# Patient Record
Sex: MALE | Race: BLACK OR AFRICAN AMERICAN | NOT HISPANIC OR LATINO | Employment: UNEMPLOYED | URBAN - METROPOLITAN AREA
[De-identification: names, ages, dates, MRNs, and addresses within clinical notes are randomized per-mention and may not be internally consistent; named-entity substitution may affect disease eponyms.]

---

## 2023-12-06 ENCOUNTER — HOSPITAL ENCOUNTER (EMERGENCY)
Facility: HOSPITAL | Age: 58
Discharge: HOME | End: 2023-12-07
Attending: EMERGENCY MEDICINE
Payer: MEDICAID

## 2023-12-06 DIAGNOSIS — B86 SCABIES: Primary | ICD-10-CM

## 2023-12-06 LAB
ANION GAP SERPL CALC-SCNC: 12 MMOL/L (ref 10–20)
BASOPHILS # BLD MANUAL: 0.11 X10*3/UL (ref 0–0.1)
BASOPHILS NFR BLD MANUAL: 1.7 %
BUN SERPL-MCNC: 15 MG/DL (ref 6–23)
CALCIUM SERPL-MCNC: 10.4 MG/DL (ref 8.6–10.6)
CHLORIDE SERPL-SCNC: 104 MMOL/L (ref 98–107)
CO2 SERPL-SCNC: 29 MMOL/L (ref 21–32)
CREAT SERPL-MCNC: 0.79 MG/DL (ref 0.5–1.3)
EOSINOPHIL # BLD MANUAL: 0.91 X10*3/UL (ref 0–0.7)
EOSINOPHIL NFR BLD MANUAL: 13.8 %
ERYTHROCYTE [DISTWIDTH] IN BLOOD BY AUTOMATED COUNT: 12.9 % (ref 11.5–14.5)
GFR SERPL CREATININE-BSD FRML MDRD: >90 ML/MIN/1.73M*2
GLUCOSE SERPL-MCNC: 96 MG/DL (ref 74–99)
HCT VFR BLD AUTO: 43.4 % (ref 41–52)
HGB BLD-MCNC: 14.6 G/DL (ref 13.5–17.5)
HIV 1+2 AB+HIV1 P24 AG SERPL QL IA: NONREACTIVE
IMM GRANULOCYTES # BLD AUTO: 0.02 X10*3/UL (ref 0–0.7)
IMM GRANULOCYTES NFR BLD AUTO: 0.3 % (ref 0–0.9)
LYMPHOCYTES # BLD MANUAL: 2.33 X10*3/UL (ref 1.2–4.8)
LYMPHOCYTES NFR BLD MANUAL: 35.3 %
MCH RBC QN AUTO: 27.3 PG (ref 26–34)
MCHC RBC AUTO-ENTMCNC: 33.6 G/DL (ref 32–36)
MCV RBC AUTO: 81 FL (ref 80–100)
MONOCYTES # BLD MANUAL: 0.23 X10*3/UL (ref 0.1–1)
MONOCYTES NFR BLD MANUAL: 3.5 %
NEUTS SEG # BLD MANUAL: 3.02 X10*3/UL (ref 1.2–7)
NEUTS SEG NFR BLD MANUAL: 45.7 %
NRBC BLD-RTO: 0 /100 WBCS (ref 0–0)
PLATELET # BLD AUTO: 271 X10*3/UL (ref 150–450)
POTASSIUM SERPL-SCNC: 3.9 MMOL/L (ref 3.5–5.3)
RBC # BLD AUTO: 5.34 X10*6/UL (ref 4.5–5.9)
RBC MORPH BLD: ABNORMAL
SODIUM SERPL-SCNC: 141 MMOL/L (ref 136–145)
TARGETS BLD QL SMEAR: ABNORMAL
TOTAL CELLS COUNTED BLD: 116
WBC # BLD AUTO: 6.6 X10*3/UL (ref 4.4–11.3)

## 2023-12-06 PROCEDURE — 99284 EMERGENCY DEPT VISIT MOD MDM: CPT | Performed by: EMERGENCY MEDICINE

## 2023-12-06 PROCEDURE — 87389 HIV-1 AG W/HIV-1&-2 AB AG IA: CPT

## 2023-12-06 PROCEDURE — 36415 COLL VENOUS BLD VENIPUNCTURE: CPT

## 2023-12-06 PROCEDURE — 85007 BL SMEAR W/DIFF WBC COUNT: CPT

## 2023-12-06 PROCEDURE — 86780 TREPONEMA PALLIDUM: CPT | Performed by: EMERGENCY MEDICINE

## 2023-12-06 PROCEDURE — 2500000001 HC RX 250 WO HCPCS SELF ADMINISTERED DRUGS (ALT 637 FOR MEDICARE OP)

## 2023-12-06 PROCEDURE — 85027 COMPLETE CBC AUTOMATED: CPT

## 2023-12-06 PROCEDURE — 36415 COLL VENOUS BLD VENIPUNCTURE: CPT | Performed by: EMERGENCY MEDICINE

## 2023-12-06 PROCEDURE — 99283 EMERGENCY DEPT VISIT LOW MDM: CPT

## 2023-12-06 PROCEDURE — 82374 ASSAY BLOOD CARBON DIOXIDE: CPT

## 2023-12-06 RX ORDER — CEPHALEXIN 250 MG/1
500 CAPSULE ORAL ONCE
Status: COMPLETED | OUTPATIENT
Start: 2023-12-06 | End: 2023-12-06

## 2023-12-06 RX ORDER — DIPHENHYDRAMINE HCL 25 MG
25 CAPSULE ORAL ONCE
Status: COMPLETED | OUTPATIENT
Start: 2023-12-06 | End: 2023-12-06

## 2023-12-06 RX ADMIN — DIPHENHYDRAMINE HYDROCHLORIDE 25 MG: 25 CAPSULE ORAL at 22:52

## 2023-12-06 RX ADMIN — CEPHALEXIN 500 MG: 250 CAPSULE ORAL at 22:52

## 2023-12-06 ASSESSMENT — COLUMBIA-SUICIDE SEVERITY RATING SCALE - C-SSRS
6. HAVE YOU EVER DONE ANYTHING, STARTED TO DO ANYTHING, OR PREPARED TO DO ANYTHING TO END YOUR LIFE?: NO
2. HAVE YOU ACTUALLY HAD ANY THOUGHTS OF KILLING YOURSELF?: NO
1. IN THE PAST MONTH, HAVE YOU WISHED YOU WERE DEAD OR WISHED YOU COULD GO TO SLEEP AND NOT WAKE UP?: NO

## 2023-12-06 ASSESSMENT — PAIN SCALES - GENERAL: PAINLEVEL_OUTOF10: 3

## 2023-12-06 ASSESSMENT — PAIN - FUNCTIONAL ASSESSMENT: PAIN_FUNCTIONAL_ASSESSMENT: 0-10

## 2023-12-06 NOTE — Clinical Note
Michelle Betancourt was seen and treated in our emergency department on 12/6/2023.  He may return to work on 12/08/2023.       If you have any questions or concerns, please don't hesitate to call.      Beryl Bustillos MD

## 2023-12-07 VITALS
HEIGHT: 68 IN | WEIGHT: 180 LBS | DIASTOLIC BLOOD PRESSURE: 90 MMHG | RESPIRATION RATE: 18 BRPM | OXYGEN SATURATION: 94 % | SYSTOLIC BLOOD PRESSURE: 141 MMHG | HEART RATE: 77 BPM | BODY MASS INDEX: 27.28 KG/M2

## 2023-12-07 LAB — T PALLIDUM AB SER QL: NONREACTIVE

## 2023-12-07 RX ORDER — DIPHENHYDRAMINE HCL 25 MG
25 CAPSULE ORAL EVERY 8 HOURS PRN
Qty: 30 CAPSULE | Refills: 0 | Status: SHIPPED | OUTPATIENT
Start: 2023-12-07

## 2023-12-07 RX ORDER — PERMETHRIN 50 MG/G
1 CREAM TOPICAL ONCE
Qty: 1 G | Refills: 0 | Status: SHIPPED | OUTPATIENT
Start: 2023-12-07 | End: 2023-12-07

## 2023-12-07 RX ORDER — IVERMECTIN 3 MG/1
TABLET ORAL
Qty: 5 TABLET | Refills: 0 | Status: SHIPPED | OUTPATIENT
Start: 2023-12-07

## 2023-12-07 ASSESSMENT — LIFESTYLE VARIABLES
REASON UNABLE TO ASSESS: NO
EVER FELT BAD OR GUILTY ABOUT YOUR DRINKING: NO
EVER HAD A DRINK FIRST THING IN THE MORNING TO STEADY YOUR NERVES TO GET RID OF A HANGOVER: NO
HAVE YOU EVER FELT YOU SHOULD CUT DOWN ON YOUR DRINKING: NO
HAVE PEOPLE ANNOYED YOU BY CRITICIZING YOUR DRINKING: NO

## 2023-12-07 NOTE — ED TRIAGE NOTES
Patient reports multiple bites from a flying insect this week having a reaction all over body. Pt would also like to be medically cleared for dental work.

## 2023-12-07 NOTE — ED PROVIDER NOTES
CC: Wound Check, Rash, and Dental Problem     HPI: Michelle Betancourt is an 58 y.o. male with no past medical history presenting to the emergency department for a rash.  He reports a persistent going on for the last few weeks.  He notes that it began as what he believes are bug bites.  He describes it as starting in 1 area and now spreading throughout his whole body.  He notes that it is itchy.  Denies burning.  Denies history of HIV.  Notes that no one else in the house has been experiencing both bites.  He describes them as everywhere.  Denies fevers and chills.    Limitations to History: None  Additional History Obtained from: Patient and patient's family    PMHx/PSHx:  Per HPI.   - has no past medical history on file.  - has no past surgical history on file.    Social History:  - Tobacco:  has no history on file for tobacco use.   - Alcohol:  has no history on file for alcohol use.   - Drugs:  has no history on file for drug use.     Medications: Reviewed in EMR.     Allergies:  Patient has no known allergies.    ???????????????????????????????????????????????????????????????  Triage Vitals:  T    HR 77  /90  RR 18  O2 94 %      Physical Exam  Constitutional:       General: He is not in acute distress.  HENT:      Head: Normocephalic.   Cardiovascular:      Rate and Rhythm: Normal rate.   Pulmonary:      Effort: Pulmonary effort is normal. No respiratory distress.   Abdominal:      General: Abdomen is flat.   Musculoskeletal:         General: Normal range of motion.   Skin:     General: Skin is dry.      Comments: Multiple vesicular rashes.  He has clusters of these located in multiple regions, most evident on the proximal right upper extremity and right forearm. There is central crusting in this area. He also has isolated locations scattered on the bilateral upper extremities and linear pustules on the back in 3-4 areas measuring 3-8 cm in length. They have an erythematous base but do not appear  infected or cellulitis. He has very faint 1-2 spots on the palms.   Neurological:      Mental Status: He is alert and oriented to person, place, and time. Mental status is at baseline.       ???????????????????????????????????????????????????????????????      ED Course/Medical Decision Making:  Patient is a 58-year-old male presenting with a skin rash.  Differential at this time included chickenpox versus monkeypox versus scabies versus HIV versus lymphoma versus syphilis.  We obtained a HIV, syphilis and CBC and CMP.  HIV was negative.  CBC was unremarkable.  Patient was signed out to incoming provider pending syphilis.     If negative we will plan to reach patient's additional parenteral, and treat for scabies as the patient is insistent on these being bug bites.  Clinically do not necessarily consistent with bug bites, but the linear lesions on the patient's back could be secondary to scabies.  He is treated orally with ivermectin and given a prescription if his syphilis is negative.  He will certainly need follow-up with dermatology, and this was discussed with the patient, and he understands the importance of this.    External records reviewed: recent inpatient, clinic, and prior ED notes  Diagnostic imaging independently reviewed/interpreted by me (as reflected in MDM) includes: N/A  Social Determinants Affecting Care: None identified  Discussion of management with other providers: N/A  Prescription Drug Consideration: Considered Keflex, Benadryl and permethrin and ivermectin  Escalation of Care: na    Impression:   Rash    Disposition: Handoff pending syphilis results, and likely discharge      Procedures ? [email or web address] last updated 12/8/2023 2:07 AM       -------------------------------------------  This patient was seen by the resident physician.  I have seen and examined the patient, agree with the workup, evaluation, management and diagnosis. I reviewed and edited the above documentation where necessary.      Ottoniel Hernandez MD   Attending Physician      Ottoniel Hernandez MD MPH  12/08/23 0212       Ottoniel Hernandez MD MPH  12/08/23 0212

## 2023-12-07 NOTE — DISCHARGE INSTRUCTIONS
Please follow-up with dermatology.  Please follow-up with primary care as well.  Please take medications as prescribed.  Please return if symptoms or not improving within 3 days for unable to get an outpatient appointment..

## 2023-12-11 PROBLEM — I26.99 PULMONARY EMBOLISM (MULTI): Status: ACTIVE | Noted: 2023-12-11

## 2023-12-11 RX ORDER — WARFARIN 1 MG/1
1 TABLET ORAL 2 TIMES DAILY
COMMUNITY
Start: 2021-12-13 | End: 2023-12-12 | Stop reason: ALTCHOICE

## 2023-12-11 RX ORDER — WARFARIN SODIUM 5 MG/1
5 TABLET ORAL
COMMUNITY
Start: 2021-12-13 | End: 2023-12-12 | Stop reason: ALTCHOICE

## 2023-12-11 NOTE — PROGRESS NOTES
Michelle Betancourt is a 58 y.o. male with idiopathic submassive DVT and PE in 2021 (follows with vascular medicine, antiphospholipid workup negative) presenting to [facility] after ED visit for rash, which is thought to be due to scabies. HIV and syphillis tests were negative.  Patient was discharged on ivermectin 3 mg tablet and diphenhydramine.  Was advised to also follow-up with dermatology    Subjective     Reports bit by small insect initially on leg 5-6 weeks ago. Insect was flying/jumping. It started to worsen and he tried witch hazel which exacerbated rash. The rash then further worsened and is present in multiple locations from neck to shins including genital areas. Not on face, mouth, or scalp. The rash is painful 4/10 and itchy. 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  Patient was Given ivermectin PO and permetherin and banophen. Some improvement after this treatment course    Less exercise in last 3 years, shoulder injury   BP has 120/80s    Missing one-two teeth    Bruising in iv site from ED       No past medical history on file.     Review of Systems   Constitutional:  Negative for chills, diaphoresis and fatigue.   HENT:  Negative for congestion and dental problem.    Eyes:  Negative for discharge.   Respiratory:  Negative for apnea.    Gastrointestinal:  Negative for abdominal distention.   Genitourinary:  Negative for difficulty urinating and dysuria.   Musculoskeletal:  Negative for arthralgias.   Skin:  Positive for color change and rash. Negative for pallor and wound.   Allergic/Immunologic: Negative for environmental allergies.   Hematological:  Negative for adenopathy.        Current Outpatient Medications on File Prior to Visit   Medication Sig Dispense Refill    diphenhydrAMINE (BenadryL) 25 mg capsule Take 1 capsule (25 mg) by mouth every 8 hours if needed for itching or allergies. 30 capsule 0    ivermectin (Stromectol) 3 mg tablet give on days 1, 2, 8, 9, and 15. Use  permethrin cream on those days. 5 tablet 0    [] permethrin (Elimite) 5 % cream Apply 1 Application topically 1 time for 1 dose. Apply to entire body; leave on for 8 to 14 hours before removing by washing (shower or bath). Repeat this regimen daily for 7 days, and then twice weekly until symptoms have resolved. 1 g 0     No current facility-administered medications on file prior to visit.        Objective     Last Recorded Vitals  There were no vitals taken for this visit.    Physical Exam  HENT:      Head: Normocephalic.   Pulmonary:      Effort: Pulmonary effort is normal.   Musculoskeletal:      Comments: Scabbed lesions throughout back, legs, arms, genitals, and trunk. From back of neck to lower shins. No oral mucosa involvement. Largest on right forearm. Itchy, but no scratch marks on lesion. No weeping or bleeding. Please see media tab for representative image.    Neurological:      Mental Status: He is alert.           Media Information      Document Information        Admission Weight       Daily Weight  23 : 81.6 kg (180 lb)    Image Results  Electrocardiogram 12 Lead  Please see ED Provider Note for formal interpretation  Confirmed by Norris Jarrett ([id number]) on 2022 3:09:38 PM      Relevant Results    Lab Results   Component Value Date    WBC 6.6 2023    HGB 14.6 2023    HCT 43.4 2023    MCV 81 2023     2023          Chemistry    Lab Results   Component Value Date/Time     2023 2241    K 3.9 2023 2241     2023 2241    CO2 29 2023 2241    BUN 15 2023 2241    CREATININE 0.79 2023 2241    Lab Results   Component Value Date/Time    CALCIUM 10.4 2023 2241    ALKPHOS 78 2022 1706    AST 19 2022 1706    ALT 15 2022 1706    BILITOT 0.5 2022 1706        Lab Results   Component Value Date    CHOL 232 (H) 2023     Lab Results   Component Value Date    HDL 39.7 2023     Lab  "Results   Component Value Date    LDLCALC 140 (H) 12/12/2023     Lab Results   Component Value Date    TRIG 262 (H) 12/12/2023     No components found for: \"CHOLHDL\"   No results found for: \"HGBA1C\"    The 10-year ASCVD risk score (Ruby PA, et al., 2019) is: 10.6%    Values used to calculate the score:      Age: 58 years      Sex: Male      Is Non- : Yes      Diabetic: No      Tobacco smoker: No      Systolic Blood Pressure: 146 mmHg      Is BP treated: No      HDL Cholesterol: 39.7 mg/dL      Total Cholesterol: 232 mg/dL                 Assessment/Plan        58 y.o. with idiopathic submassive DVT and PE in 2021 (follows with vascular medicine, antiphospholipid workup negative, finished 10 months of warfarin therapy) [place] [facility] [place].         #diffuse rash neck to lower shins.   - images in media tab. Not on oral mucosa, but present on genitals  - no prior dermatologic issues  - feels that it was related to insect bite  - HIV and syphillis negative  - reported some improvement when on ivermectin PO, permetherin, and banophen creams.   - no new lesions since ED visit  Plan:   - dermatology referral  - will reorder permetherin  - okay to finish ivermectin course and complete banophane     #elevated blood pressure without diagnosis of hypertenison  - reports decreased exercise over last 3 years  - wants to work on diet and exercise interventions  - reports that he gets nervous at doctors office  - will continue to monitor and address at future visits    #dyslipidemia  - 12/2023 non-fasting lipid panel: HDL 39.7, , Triglycerides: 262  - ASCVD 10.6%  - advised patient to implement diet and exercise changes, and to get fasting lipid panel prior to next visit  - if still high at next visit, should consider starting statin.     #unprovoked idiopathic DVT and PE  - followed with vascular medicine  - was recommended to complete 6 months of AC  - completed 10 months of " "warfarin. Not on warfarin anymore.     #missing tooth  - dental follow up scheduled    #health maintenance  - colon cancer screening: declines. Reports no family history of cancer  - prostate cancer screening: declines  - vaccines: Declines all.       #recent labs  - Renal/liver function nwnl 12/2023  - Lipid panel abnormal 12/2023  - A1c pending 12/2023  - HIV negative 12/2023  - Syphilis negative 12/2023  - TSH normal 12/2023  - urine : normal 12/2023    No results found for: \"HDL\", \"LDL\", \"TSH\"               Daryl Nation MD      I saw and evaluated the patient. I personally obtained the key and critical portions of the history and physical exam or was physically present for key and critical portions performed by the resident/fellow. I reviewed the resident/fellow's documentation and discussed the patient with the resident/fellow. I agree with the resident/fellow's medical decision making as documented in the note.    Alfreda Bingham MD    Patient care and presentation reviewed with the resident  NAME: Michelle Betancourt  HPI:  58 year old male here for rash.  Went to ED last week and referred here.  Had rash that started as insect bite on leg spread to his entire body from lower neck to shins.  NO oral mucosal lesions.  Biggest lesion on his right arm.  It itches and painful.  In ED they did HIV and syphilis test.  Was given Ivermectin, permethrin and banofin.    PMHx: DVT/PE unprovoked 2021 completed anticoagulation therapy  MEDS:  ivermectin, permethrin, diphenhydramine  ALLERGIES:  SOCIAL HX:   OBJECTIVE: crusted lesions arm, back, rare on abdomen  RECOMMEND:  We walked down to dermatology and got him an appointment for Thursday.   Handouts printed for him.     Other prevention and screening addressed per resident  Not interested in any vaccinations  "

## 2023-12-12 ENCOUNTER — OFFICE VISIT (OUTPATIENT)
Dept: PRIMARY CARE | Facility: HOSPITAL | Age: 58
End: 2023-12-12
Payer: MEDICAID

## 2023-12-12 VITALS
OXYGEN SATURATION: 96 % | BODY MASS INDEX: 29.21 KG/M2 | HEIGHT: 68 IN | HEART RATE: 99 BPM | WEIGHT: 192.7 LBS | DIASTOLIC BLOOD PRESSURE: 87 MMHG | SYSTOLIC BLOOD PRESSURE: 146 MMHG | TEMPERATURE: 99.1 F

## 2023-12-12 DIAGNOSIS — E78.5 DYSLIPIDEMIA (HIGH LDL; LOW HDL): ICD-10-CM

## 2023-12-12 DIAGNOSIS — Z13.9 SCREENING DUE: Primary | ICD-10-CM

## 2023-12-12 DIAGNOSIS — R21 RASH: ICD-10-CM

## 2023-12-12 LAB
ALBUMIN SERPL BCP-MCNC: 4.7 G/DL (ref 3.4–5)
ALP SERPL-CCNC: 67 U/L (ref 33–120)
ALT SERPL W P-5'-P-CCNC: 25 U/L (ref 10–52)
ANION GAP SERPL CALC-SCNC: 13 MMOL/L (ref 10–20)
APPEARANCE UR: ABNORMAL
AST SERPL W P-5'-P-CCNC: 22 U/L (ref 9–39)
BILIRUB SERPL-MCNC: 0.5 MG/DL (ref 0–1.2)
BILIRUB UR STRIP.AUTO-MCNC: NEGATIVE MG/DL
BUN SERPL-MCNC: 12 MG/DL (ref 6–23)
CALCIUM SERPL-MCNC: 10.3 MG/DL (ref 8.6–10.6)
CHLORIDE SERPL-SCNC: 103 MMOL/L (ref 98–107)
CHOLEST SERPL-MCNC: 232 MG/DL (ref 0–199)
CHOLESTEROL/HDL RATIO: 5.8
CO2 SERPL-SCNC: 28 MMOL/L (ref 21–32)
COLOR UR: YELLOW
CREAT SERPL-MCNC: 0.87 MG/DL (ref 0.5–1.3)
CREAT UR-MCNC: 259.1 MG/DL (ref 20–370)
ERYTHROCYTE [DISTWIDTH] IN BLOOD BY AUTOMATED COUNT: 13.3 % (ref 11.5–14.5)
GFR SERPL CREATININE-BSD FRML MDRD: >90 ML/MIN/1.73M*2
GLUCOSE SERPL-MCNC: 100 MG/DL (ref 74–99)
GLUCOSE UR STRIP.AUTO-MCNC: NEGATIVE MG/DL
HCT VFR BLD AUTO: 45.6 % (ref 41–52)
HDLC SERPL-MCNC: 39.7 MG/DL
HGB BLD-MCNC: 14.9 G/DL (ref 13.5–17.5)
KETONES UR STRIP.AUTO-MCNC: NEGATIVE MG/DL
LDLC SERPL CALC-MCNC: 140 MG/DL
LEUKOCYTE ESTERASE UR QL STRIP.AUTO: NEGATIVE
MCH RBC QN AUTO: 27.2 PG (ref 26–34)
MCHC RBC AUTO-ENTMCNC: 32.7 G/DL (ref 32–36)
MCV RBC AUTO: 83 FL (ref 80–100)
MICROALBUMIN UR-MCNC: 17.8 MG/L
MICROALBUMIN/CREAT UR: 6.9 UG/MG CREAT
NITRITE UR QL STRIP.AUTO: NEGATIVE
NON HDL CHOLESTEROL: 192 MG/DL (ref 0–149)
NRBC BLD-RTO: 0 /100 WBCS (ref 0–0)
PH UR STRIP.AUTO: 5 [PH]
PLATELET # BLD AUTO: 279 X10*3/UL (ref 150–450)
POC APPEARANCE, URINE: ABNORMAL
POC BILIRUBIN, URINE: NEGATIVE
POC BLOOD, URINE: ABNORMAL
POC COLOR, URINE: ABNORMAL
POC GLUCOSE, URINE: NEGATIVE MG/DL
POC KETONES, URINE: ABNORMAL MG/DL
POC LEUKOCYTES, URINE: NEGATIVE
POC NITRITE,URINE: NEGATIVE
POC PH, URINE: 6 PH
POC PROTEIN, URINE: ABNORMAL MG/DL
POC SPECIFIC GRAVITY, URINE: >=1.03
POC UROBILINOGEN, URINE: 1 EU/DL
POTASSIUM SERPL-SCNC: 4.6 MMOL/L (ref 3.5–5.3)
PROT SERPL-MCNC: 8.4 G/DL (ref 6.4–8.2)
PROT UR STRIP.AUTO-MCNC: NEGATIVE MG/DL
RBC # BLD AUTO: 5.47 X10*6/UL (ref 4.5–5.9)
RBC # UR STRIP.AUTO: NEGATIVE /UL
SODIUM SERPL-SCNC: 139 MMOL/L (ref 136–145)
SP GR UR STRIP.AUTO: 1.03
TRIGL SERPL-MCNC: 262 MG/DL (ref 0–149)
TSH SERPL-ACNC: 2.02 MIU/L (ref 0.44–3.98)
UROBILINOGEN UR STRIP.AUTO-MCNC: 4 MG/DL
VLDL: 52 MG/DL (ref 0–40)
WBC # BLD AUTO: 6.6 X10*3/UL (ref 4.4–11.3)

## 2023-12-12 PROCEDURE — 83036 HEMOGLOBIN GLYCOSYLATED A1C: CPT

## 2023-12-12 PROCEDURE — 81003 URINALYSIS AUTO W/O SCOPE: CPT

## 2023-12-12 PROCEDURE — 84520 ASSAY OF UREA NITROGEN: CPT

## 2023-12-12 PROCEDURE — 80061 LIPID PANEL: CPT

## 2023-12-12 PROCEDURE — 82570 ASSAY OF URINE CREATININE: CPT

## 2023-12-12 PROCEDURE — 99214 OFFICE O/P EST MOD 30 MIN: CPT

## 2023-12-12 PROCEDURE — 85027 COMPLETE CBC AUTOMATED: CPT

## 2023-12-12 PROCEDURE — 36415 COLL VENOUS BLD VENIPUNCTURE: CPT

## 2023-12-12 PROCEDURE — 99214 OFFICE O/P EST MOD 30 MIN: CPT | Mod: GC

## 2023-12-12 PROCEDURE — 1036F TOBACCO NON-USER: CPT

## 2023-12-12 PROCEDURE — 84443 ASSAY THYROID STIM HORMONE: CPT

## 2023-12-12 RX ORDER — PERMETHRIN 50 MG/G
CREAM TOPICAL
COMMUNITY
Start: 2023-12-07

## 2023-12-12 ASSESSMENT — COLUMBIA-SUICIDE SEVERITY RATING SCALE - C-SSRS
2. HAVE YOU ACTUALLY HAD ANY THOUGHTS OF KILLING YOURSELF?: NO
6. HAVE YOU EVER DONE ANYTHING, STARTED TO DO ANYTHING, OR PREPARED TO DO ANYTHING TO END YOUR LIFE?: NO
1. IN THE PAST MONTH, HAVE YOU WISHED YOU WERE DEAD OR WISHED YOU COULD GO TO SLEEP AND NOT WAKE UP?: NO

## 2023-12-12 ASSESSMENT — ENCOUNTER SYMPTOMS
CHILLS: 0
EYE DISCHARGE: 0
ADENOPATHY: 0
FATIGUE: 0
DIFFICULTY URINATING: 0
ARTHRALGIAS: 0
DYSURIA: 0
DIAPHORESIS: 0
ABDOMINAL DISTENTION: 0
COLOR CHANGE: 1
WOUND: 0
APNEA: 0

## 2023-12-12 ASSESSMENT — PATIENT HEALTH QUESTIONNAIRE - PHQ9
SUM OF ALL RESPONSES TO PHQ9 QUESTIONS 1 AND 2: 0
2. FEELING DOWN, DEPRESSED OR HOPELESS: NOT AT ALL
1. LITTLE INTEREST OR PLEASURE IN DOING THINGS: NOT AT ALL

## 2023-12-12 ASSESSMENT — PAIN SCALES - GENERAL: PAINLEVEL: 4

## 2023-12-12 NOTE — PATIENT INSTRUCTIONS
Cordell Betancourt,    Hope you are well. You were seen for Rash.     Please follow up:    Dermatology appointment 12/14 2:15pm  Continue permethermin, banophen, and ivermectin  Follow up to see us in clinic in 6 months for blood pressure, cholesterol level  Continue exercise and diet    Daryl Padilla MD [facility]    Discharge Meds     Your medication list            Accurate as of December 12, 2023  2:19 PM. If you have any questions, ask your nurse or doctor.                CONTINUE taking these medications        Instructions Last Dose Given Next Dose Due   diphenhydrAMINE 25 mg capsule  Commonly known as: BenadryL      Take 1 capsule (25 mg) by mouth every 8 hours if needed for itching or allergies.       ivermectin 3 mg tablet  Commonly known as: Stromectol      give on days 1, 2, 8, 9, and 15. Use permethrin cream on those days.       permethrin 5 % cream  Commonly known as: Elimite                  STOP taking these medications      warfarin 1 mg tablet  Commonly known as: Coumadin  Stopped by: Daryl Nation MD        warfarin 5 mg tablet  Commonly known as: Coumadin  Stopped by: Daryl Nation MD                 Outpatient Follow-Up  Future Appointments   Date Time Provider Department Center   12/14/2023  2:15 PM Manjeet Arreaga MD [id number]

## 2023-12-13 LAB
EST. AVERAGE GLUCOSE BLD GHB EST-MCNC: 97 MG/DL
HBA1C MFR BLD: 5 %

## 2023-12-13 NOTE — PROGRESS NOTES
I saw and evaluated the patient. I personally obtained the key and critical portions of the history and physical exam or was physically present for key and critical portions performed by the resident/fellow. I reviewed the resident/fellow's documentation and discussed the patient with the resident/fellow. I agree with the resident/fellow's medical decision making as documented in the note.    Patient care and presentation reviewed with the resident  NAME: Michelle Betancourt  HPI:  58 year old male here for rash.  Went to ED last week and referred here.  Had rash that started as insect bite on leg spread to his entire body from lower neck to shins.  NO oral mucosal lesions.  Biggest lesion on his right arm.  It itches and painful.  In ED they did HIV and syphilis test.  Was given Ivermectin, permethrin and banofin.    PMHx: DVT/PE unprovoked 2021 completed anticoagulation therapy  MEDS:  ivermectin, permethrin, diphenhydramine  ALLERGIES:  SOCIAL HX:   OBJECTIVE: crusted lesions arm, back, rare on abdomen  RECOMMEND:  We walked down to dermatology and got him an appointment for Thursday.   Handouts printed for him.     Other prevention and screening addressed per resident  Not interested in any vaccinations  Alfreda Bingham MD

## 2023-12-14 ENCOUNTER — OFFICE VISIT (OUTPATIENT)
Dept: DERMATOLOGY | Facility: CLINIC | Age: 58
End: 2023-12-14
Payer: MEDICAID

## 2023-12-14 DIAGNOSIS — R21 RASH AND OTHER NONSPECIFIC SKIN ERUPTION: Primary | ICD-10-CM

## 2023-12-14 DIAGNOSIS — L30.9 DERMATITIS: ICD-10-CM

## 2023-12-14 PROCEDURE — 1036F TOBACCO NON-USER: CPT | Performed by: DERMATOLOGY

## 2023-12-14 PROCEDURE — 88305 TISSUE EXAM BY PATHOLOGIST: CPT | Performed by: DERMATOLOGY

## 2023-12-14 PROCEDURE — 11104 PUNCH BX SKIN SINGLE LESION: CPT | Performed by: DERMATOLOGY

## 2023-12-14 PROCEDURE — 99203 OFFICE O/P NEW LOW 30 MIN: CPT | Performed by: DERMATOLOGY

## 2023-12-14 PROCEDURE — 88305 TISSUE EXAM BY PATHOLOGIST: CPT | Mod: TC,DER | Performed by: DERMATOLOGY

## 2023-12-14 PROCEDURE — 87529 HSV DNA AMP PROBE: CPT | Performed by: DERMATOLOGY

## 2023-12-14 RX ORDER — PERMETHRIN 50 MG/G
CREAM TOPICAL DAILY
Qty: 60 G | Refills: 11 | Status: SHIPPED | OUTPATIENT
Start: 2023-12-14

## 2023-12-14 ASSESSMENT — ITCH NUMERIC RATING SCALE: HOW SEVERE IS YOUR ITCHING?: 5

## 2023-12-14 NOTE — PROGRESS NOTES
Subjective     Michelle Betancourt is a 58 y.o. male who presents for the following: scabies (Prescribed creams and pills - not working ). Used permethrin only on spots. Started last Friday and using every night. Ivermectin is ongoing. Started 5-6 weeks ago. He has not traveled in the past two months.  He lives with his brother who has not traveled and does the same type of work. No pets at home.  He is not around kids. He cooks as a hobby. He cleans factories and warehouses. He takes no medications other than what were prescribed for this condition. He does not get chemicals except on his shoes.  He wears gloves when he works with chemicals.  He had chicken pox as a child.    Review of Systems:  No other skin or systemic complaints other than what is documented elsewhere in the note.    The following portions of the chart were reviewed this encounter and updated as appropriate:          Skin Cancer History  No skin cancer on file.      Specialty Problems    None       Objective   Well appearing patient in no apparent distress; mood and affect are within normal limits.    A focused skin examination was performed. All findings within normal limits unless otherwise noted below.    Assessment/Plan

## 2023-12-16 LAB
HSV1 DNA SKIN QL NAA+PROBE: NOT DETECTED
HSV2 DNA SKIN QL NAA+PROBE: NOT DETECTED

## 2023-12-21 LAB
LABORATORY COMMENT REPORT: NORMAL
PATH REPORT.FINAL DX SPEC: NORMAL
PATH REPORT.GROSS SPEC: NORMAL
PATH REPORT.MICROSCOPIC SPEC OTHER STN: NORMAL
PATH REPORT.RELEVANT HX SPEC: NORMAL
PATH REPORT.TOTAL CANCER: NORMAL

## 2023-12-22 ENCOUNTER — TELEPHONE (OUTPATIENT)
Dept: LAB | Facility: HOSPITAL | Age: 58
End: 2023-12-22
Payer: MEDICAID

## 2023-12-22 DIAGNOSIS — L30.9 DERMATITIS, UNSPECIFIED: Primary | ICD-10-CM

## 2023-12-22 RX ORDER — FLUOCINONIDE 0.5 MG/G
OINTMENT TOPICAL 2 TIMES DAILY
Qty: 120 G | Refills: 2 | Status: SHIPPED | OUTPATIENT
Start: 2023-12-22 | End: 2024-01-09 | Stop reason: SDUPTHER

## 2023-12-22 NOTE — TELEPHONE ENCOUNTER
I spoke with the patient about his results (negative viral swab test) and biopsy showing an eczematous condition. He treated with permethrin last week and is going to apply his second course tonight. His rash is unchanged. I instructed him to do the second treatment today.  I added lidex as I favor an eczematous dermatitis over scabies.  I asked him to followup in 3 weeks.

## 2023-12-28 ENCOUNTER — APPOINTMENT (OUTPATIENT)
Dept: DERMATOLOGY | Facility: CLINIC | Age: 58
End: 2023-12-28
Payer: MEDICAID

## 2024-01-09 ENCOUNTER — OFFICE VISIT (OUTPATIENT)
Dept: PRIMARY CARE | Facility: HOSPITAL | Age: 59
End: 2024-01-09
Payer: MEDICAID

## 2024-01-09 VITALS
BODY MASS INDEX: 28.79 KG/M2 | DIASTOLIC BLOOD PRESSURE: 92 MMHG | SYSTOLIC BLOOD PRESSURE: 154 MMHG | WEIGHT: 190 LBS | HEIGHT: 68 IN | OXYGEN SATURATION: 96 % | HEART RATE: 80 BPM | TEMPERATURE: 98.9 F

## 2024-01-09 DIAGNOSIS — L30.9 DERMATITIS, UNSPECIFIED: ICD-10-CM

## 2024-01-09 PROCEDURE — 99213 OFFICE O/P EST LOW 20 MIN: CPT | Mod: GC,GE

## 2024-01-09 PROCEDURE — 1036F TOBACCO NON-USER: CPT

## 2024-01-09 PROCEDURE — 99213 OFFICE O/P EST LOW 20 MIN: CPT

## 2024-01-09 RX ORDER — FLUOCINONIDE 0.5 MG/G
OINTMENT TOPICAL 2 TIMES DAILY
Qty: 120 G | Refills: 5 | Status: SHIPPED | OUTPATIENT
Start: 2024-01-09

## 2024-01-09 ASSESSMENT — PATIENT HEALTH QUESTIONNAIRE - PHQ9
2. FEELING DOWN, DEPRESSED OR HOPELESS: NOT AT ALL
SUM OF ALL RESPONSES TO PHQ9 QUESTIONS 1 AND 2: 0
1. LITTLE INTEREST OR PLEASURE IN DOING THINGS: NOT AT ALL

## 2024-01-09 ASSESSMENT — ENCOUNTER SYMPTOMS
OCCASIONAL FEELINGS OF UNSTEADINESS: 0
DEPRESSION: 0
LOSS OF SENSATION IN FEET: 0

## 2024-01-09 ASSESSMENT — PAIN SCALES - GENERAL: PAINLEVEL: 0-NO PAIN

## 2024-01-09 NOTE — LETTER
Dear Mr. Betancourt,     Thank you for your visit to the [facility] today.     In the clinic, we discussed the following today:     1) You required a refill of your Lidex ointment; I have given you 5 refills, and Dr. Arreaga can further talk to you about your treatment options moving forward. You have an appointment with him in February.     2) In the clinic today, your rash looks much improved.     3) We spoke about your elevated blood pressure profiles; we spoke about how lifestyle modifications can help lower your blood pressures and what your target goals are.     Eddie Fernandes MD  [facility] [address]/[facility]

## 2024-01-09 NOTE — PATIENT INSTRUCTIONS
Dear Mr. Betancourt,     Thank you for your visit to the [facility] today.     In the clinic, we discussed the following today:     1) You required a refill of your Lidex ointment; I have given you 5 refills, and Dr. Arreaga can further talk to you about your treatment options moving forward. You have an appointment with him in February.     2) In the clinic today, your rash looks much improved.     3) We spoke about your elevated blood pressure profiles; we spoke about how lifestyle modifications can help lower your blood pressures and what your target goals are.     Eddie Fernandes MD  [facility]

## 2024-01-09 NOTE — PROGRESS NOTES
"Subjective   Patient ID: Michelle Betancourt is a 58 y.o. male who presents as follow up of his rash.    HPI   Michelle Betancourt is a 58 y.o. male with idiopathic submassive DVT and PE in 2021 (follows with vascular medicine, antiphospholipid workup negative) [place] [facility] [place] for a rash, which was initially thought to be due to scabies. Dermatology was consulted and the patient was seen in the derm clinic. HIV and syphillis tests were negative.  Dermatology performed a skin biopsy last time, which showed:     \"The punch biopsy reveals diffuse epidermal spongiosis with an eccentrically-located subcorneal to intraepidermal split.  There is a neutrophilic serum-crust in this area.  There is mild acantholysis, which may be incidental.  There is a mild to moderate superficial to mid-dermal perivascular lymphoeosinophilic infiltrate.  Extensive deeper additional step sections fail to reveal additional findings. \"     The patient completed 2 permethrin treatments and derm added lidex as they favored eczematous dermatitis over scabies. Today, his rash is much improved after libex treatment. He requires lidex refill.     Review of Systems  Constitutional:  Negative for chills, diaphoresis and fatigue.   HENT:  Negative for congestion and dental problem.    Eyes:  Negative for discharge.   Respiratory:  Negative for apnea.    Gastrointestinal:  Negative for abdominal distention.   Genitourinary:  Negative for difficulty urinating and dysuria.   Musculoskeletal:  Negative for arthralgias.   Skin:  Positive for color change and rash. Negative for pallor and wound.   Allergic/Immunologic: Negative for environmental allergies.   Hematological:  Negative for adenopathy.  Objective   There were no vitals taken for this visit.    Physical Exam  HENT:      Head: Normocephalic.   Pulmonary:      Effort: Pulmonary effort is normal.   Musculoskeletal:      Comments: Rash is much improved compared to last documented " media image; today, the lesion is more confluent with no erythema and dark pigmentation, in keeping with resolving eczematous dermatitis  Neurological:      Mental Status: He is alert.      Assessment/Plan     Michelle Betancourt is a 58 y.o. male with idiopathic submassive DVT and PE in 2021 (follows with vascular medicine, antiphospholipid workup negative) presenting to [place] after being treated for a rash, which was initially thought to be due to scabies. Dermatology was consulted and the patient was seen in the derm clinic. HIV and syphillis tests were negative. In the clinic today, his rash is much improved and he requires lidex refill. Scheduled to see Dr. Arreaga in February, 2024. Today, he presents for lidex refill.     Plan  #Eczematous rash s/p lidex  - Derm following, most likely eczema based on recent punch biopsy and histopathology.   - Rash today is much improved after lidex application  - Refilled his prescription, will follow up with Dr. Arreaga in February.     #elevated blood pressure without diagnosis of hypertenison  - reports decreased exercise over last 3 years  - Today, he says he will get back to exercise and active lifestyle soon. He does not want pharmacotherapy but he is educated about hypertension, its ramifications, and ways to decrease his blood pressures using lifestyle modifications.   - wants to work on diet and exercise interventions  - reports that he gets nervous at doctors office  - will continue to monitor and address at future visits     #dyslipidemia  - 12/2023 non-fasting lipid panel: HDL 39.7, , Triglycerides: 262  - ASCVD 10.6%  - advised patient to implement diet and exercise changes, and to get fasting lipid panel prior to next visit  - if still high at next visit, should consider starting statin.      #unprovoked idiopathic DVT and PE  - followed with vascular medicine  - was recommended to complete 6 months of AC  - completed 10 months of warfarin. Not on warfarin  anymore.      #missing tooth  - dental follow up scheduled     #health maintenance  - colon cancer screening: declines. Reports no family history of cancer  - prostate cancer screening: declines  - vaccines: Declines all.         #recent labs  - Renal/liver function nwnl 12/2023  - Lipid panel abnormal 12/2023  - A1c pending 12/2023  - HIV negative 12/2023  - Syphilis negative 12/2023  - TSH normal 12/2023  - urine : normal 12/2023    Eddie Fernandes MD  PGY1 Internal Medicine  [facility]

## 2024-02-08 ENCOUNTER — OFFICE VISIT (OUTPATIENT)
Dept: DERMATOLOGY | Facility: CLINIC | Age: 59
End: 2024-02-08
Payer: MEDICAID

## 2024-02-08 DIAGNOSIS — L20.89 FLEXURAL ATOPIC DERMATITIS: Primary | ICD-10-CM

## 2024-02-08 PROCEDURE — 99213 OFFICE O/P EST LOW 20 MIN: CPT | Performed by: DERMATOLOGY

## 2024-02-08 PROCEDURE — 1036F TOBACCO NON-USER: CPT | Performed by: DERMATOLOGY

## 2024-02-08 RX ORDER — FLUOCINONIDE 0.5 MG/G
OINTMENT TOPICAL 2 TIMES DAILY
Qty: 60 G | Refills: 6 | Status: SHIPPED | OUTPATIENT
Start: 2024-02-08

## 2024-02-08 NOTE — PROGRESS NOTES
Subjective     Michelle Betancourt is a 59 y.o. male who presents for the following: Rash (All over. Currently using Fluocinonide 0.05% ointment. ). The fluocinonide ointment is helping. Penis is clear.     Review of Systems:  No other skin or systemic complaints other than what is documented elsewhere in the note.    The following portions of the chart were reviewed this encounter and updated as appropriate:       Skin Cancer History  No skin cancer on file.    Specialty Problems    None    Past Medical History:  Michelle Betancourt  has no past medical history on file.    Past Surgical History:  Michelle Betancourt  has no past surgical history on file.    Family History:  Patient family history is not on file.    Social History:  Michelle Betancourt  reports that he has never smoked. He has never used smokeless tobacco. He reports that he does not drink alcohol and does not use drugs.    Allergies:  Patient has no known allergies.    Current Medications / CAM's:    Current Outpatient Medications:     diphenhydrAMINE (BenadryL) 25 mg capsule, Take 1 capsule (25 mg) by mouth every 8 hours if needed for itching or allergies., Disp: 30 capsule, Rfl: 0    fluocinonide (Lidex) 0.05 % ointment, Apply topically 2 times a day. Use as needed, Disp: 120 g, Rfl: 5    ivermectin (Stromectol) 3 mg tablet, give on days 1, 2, 8, 9, and 15. Use permethrin cream on those days., Disp: 5 tablet, Rfl: 0    permethrin (Elimite) 5 % cream, PLEASE SEE ATTACHED FOR DETAILED DIRECTIONS, Disp: , Rfl:     permethrin (Elimite) 5 % cream, Apply topically once daily. Apply from neck down to toes including under fingernails and toenails to entire skin at night. Shower 12 hours later. Wash all sheets and clothes. Repeat application in 1 week., Disp: 60 g, Rfl: 11     Objective   Well appearing patient in no apparent distress; mood and affect are within normal limits.    A focused skin examination was performed. All findings within normal limits unless  otherwise noted below.    Assessment/Plan

## 2024-08-15 ENCOUNTER — APPOINTMENT (OUTPATIENT)
Dept: DERMATOLOGY | Facility: CLINIC | Age: 59
End: 2024-08-15
Payer: MEDICAID

## 2024-08-15 DIAGNOSIS — L20.89 FLEXURAL ATOPIC DERMATITIS: ICD-10-CM

## 2024-08-15 DIAGNOSIS — R21 RASH AND OTHER NONSPECIFIC SKIN ERUPTION: Primary | ICD-10-CM

## 2024-08-15 PROCEDURE — 99213 OFFICE O/P EST LOW 20 MIN: CPT | Performed by: DERMATOLOGY

## 2024-08-15 NOTE — PROGRESS NOTES
Subjective     Michelle Betancourt is a 59 y.o. male who presents for the following: Skin Check (Follow up Flexural atopic dermatitis to bilateral upper extremity(forearms to hands) ). He has not used fluocinonide since May because it seemed to flare it. He is not putting anything on it. He uses lotion on his legs and back.    Review of Systems:  No other skin or systemic complaints other than what is documented elsewhere in the note.    The following portions of the chart were reviewed this encounter and updated as appropriate:          Skin Cancer History  No skin cancer on file.      Specialty Problems    None       Objective   Well appearing patient in no apparent distress; mood and affect are within normal limits.    A focused skin examination was performed. All findings within normal limits unless otherwise noted below.    Assessment/Plan   1. Flexural atopic dermatitis    Related Procedures  Follow Up In Dermatology - Established Patient    Related Medications  fluocinonide (Lidex) 0.05 % ointment  Apply topically 2 times a day. Use as needed

## 2025-04-22 ENCOUNTER — APPOINTMENT (OUTPATIENT)
Dept: OPHTHALMOLOGY | Facility: CLINIC | Age: 60
End: 2025-04-22
Payer: MEDICAID

## 2025-04-22 DIAGNOSIS — H40.003 GLAUCOMA SUSPECT OF BOTH EYES: ICD-10-CM

## 2025-04-22 DIAGNOSIS — H52.13 MYOPIA OF BOTH EYES WITH ASTIGMATISM AND PRESBYOPIA: Primary | ICD-10-CM

## 2025-04-22 DIAGNOSIS — H52.4 MYOPIA OF BOTH EYES WITH ASTIGMATISM AND PRESBYOPIA: Primary | ICD-10-CM

## 2025-04-22 DIAGNOSIS — H25.13 AGE-RELATED NUCLEAR CATARACT OF BOTH EYES: ICD-10-CM

## 2025-04-22 DIAGNOSIS — H33.322 ROUND HOLE OF LEFT RETINA WITHOUT DETACHMENT: ICD-10-CM

## 2025-04-22 DIAGNOSIS — H52.203 MYOPIA OF BOTH EYES WITH ASTIGMATISM AND PRESBYOPIA: Primary | ICD-10-CM

## 2025-04-22 PROCEDURE — 92134 CPTRZ OPH DX IMG PST SGM RTA: CPT | Performed by: OPTOMETRIST

## 2025-04-22 PROCEDURE — 92015 DETERMINE REFRACTIVE STATE: CPT | Performed by: OPTOMETRIST

## 2025-04-22 PROCEDURE — 92004 COMPRE OPH EXAM NEW PT 1/>: CPT | Performed by: OPTOMETRIST

## 2025-04-22 ASSESSMENT — TONOMETRY
IOP_METHOD: TONOPEN
IOP_METHOD: GOLDMANN APPLANATION
OS_IOP_MMHG: 22
OS_IOP_MMHG: 24
OD_IOP_MMHG: 24
OD_IOP_MMHG: 21

## 2025-04-22 ASSESSMENT — REFRACTION_MANIFEST
OD_AXIS: 110
OS_AXIS: 080
OS_SPHERE: -3.75
OS_SPHERE: -4.00
OS_SPHERE: -3.25
OS_AXIS: 075
OD_SPHERE: -3.25
OS_ADD: +2.25
OS_CYLINDER: -0.50
OD_AXIS: 110
OS_CYLINDER: -0.50
OS_ADD: +2.50
OD_CYLINDER: -0.50
OS_CYLINDER: -0.50
OD_CYLINDER: -0.50
OD_SPHERE: -3.50
OD_ADD: +2.25
OD_AXIS: 115
OD_CYLINDER: -0.75
METHOD_AUTOREFRACTION: 1
OS_AXIS: 075
OD_ADD: +2.50
OD_SPHERE: -4.00

## 2025-04-22 ASSESSMENT — CONF VISUAL FIELD
OS_NORMAL: 1
OD_SUPERIOR_TEMPORAL_RESTRICTION: 0
OD_INFERIOR_TEMPORAL_RESTRICTION: 0
OD_SUPERIOR_NASAL_RESTRICTION: 0
OS_SUPERIOR_NASAL_RESTRICTION: 0
OS_INFERIOR_TEMPORAL_RESTRICTION: 0
OS_SUPERIOR_TEMPORAL_RESTRICTION: 0
OD_NORMAL: 1
OD_INFERIOR_NASAL_RESTRICTION: 0
OS_INFERIOR_NASAL_RESTRICTION: 0
METHOD: COUNTING FINGERS

## 2025-04-22 ASSESSMENT — ENCOUNTER SYMPTOMS
HEMATOLOGIC/LYMPHATIC NEGATIVE: 0
GASTROINTESTINAL NEGATIVE: 0
ALLERGIC/IMMUNOLOGIC NEGATIVE: 0
RESPIRATORY NEGATIVE: 0
ENDOCRINE NEGATIVE: 0
MUSCULOSKELETAL NEGATIVE: 0
CARDIOVASCULAR NEGATIVE: 0
PSYCHIATRIC NEGATIVE: 0
NEUROLOGICAL NEGATIVE: 0
EYES NEGATIVE: 1
CONSTITUTIONAL NEGATIVE: 0

## 2025-04-22 ASSESSMENT — EXTERNAL EXAM - RIGHT EYE: OD_EXAM: NORMAL

## 2025-04-22 ASSESSMENT — CUP TO DISC RATIO
OS_RATIO: 0.75
OD_RATIO: 0.7

## 2025-04-22 ASSESSMENT — VISUAL ACUITY
OD_CC+: -2
CORRECTION_TYPE: GLASSES
OS_CC: 20/20
OS_CC+: -2
METHOD: SNELLEN - LINEAR
OD_CC: 20/20

## 2025-04-22 ASSESSMENT — REFRACTION_WEARINGRX
OD_SPHERE: -4.25
OS_SPHERE: -4.50

## 2025-04-22 ASSESSMENT — SLIT LAMP EXAM - LIDS
COMMENTS: NORMAL
COMMENTS: NORMAL

## 2025-04-22 ASSESSMENT — EXTERNAL EXAM - LEFT EYE: OS_EXAM: NORMAL

## 2025-04-22 NOTE — ASSESSMENT & PLAN NOTE
1+ NS OU.  Not visually significant.  Pt educated on findings and importance of UV protection when outdoors. Recommend anti-glare coating in lenses to reduce symptoms of night glare. Continue to monitor annually at this time. Pt voiced understanding.

## 2025-04-22 NOTE — ASSESSMENT & PLAN NOTE
Moderate change from habitual Rx.  Pt educated on findings. Release Rx for full time wear. Discussed adaptation. Monitor annually. Pt voiced understanding.

## 2025-04-22 NOTE — ASSESSMENT & PLAN NOTE
High risk open angle suspect due to large cupping and higher IOP.  FMHx: None  CD Ratio: OD: 0.7/0.7, OS: 0.75/0.75  Intraocular pressure (IOP) today: 24/24mmHg (pt apprehensive)  RNFL OCT (04/22/25): OD w/ inferior temporal RNFL thinning, OS: WNL  Pt educated on findings. Discussed permanent vision loss associated with glaucoma. RTC 2-3 months for follow-up w/ IOP, pachymetry and HVF 24-2 SF. Pt voiced understanding.

## 2025-04-22 NOTE — ASSESSMENT & PLAN NOTE
Small operculated hole at 7:00 OS. Pt asymptomatic.  Pt educated on findings. Discussed signs and symptoms of retinal hole, tear, detachment. Patient educated that retinal detachment can lead to permanent vision loss. Patient consents to return if they notice new floaters, flashes, curtain or veil covering vision. Monitor annually with dilated fundus examination. Pt voiced understanding.

## 2025-04-22 NOTE — PROGRESS NOTES
Assessment/Plan   Problem List Items Addressed This Visit       Glaucoma suspect of both eyes    High risk open angle suspect due to large cupping and higher IOP.  FMHx: None  CD Ratio: OD: 0.7/0.7, OS: 0.75/0.75  Intraocular pressure (IOP) today: 24/24mmHg (pt apprehensive)  RNFL OCT (04/22/25): OD w/ inferior temporal RNFL thinning, OS: WNL  Pt educated on findings. Discussed permanent vision loss associated with glaucoma. RTC 2-3 months for follow-up w/ IOP, pachymetry and HVF 24-2 SF. Pt voiced understanding.            Relevant Orders    OCT, Retina - OU - Both Eyes (Completed)    OCT, Optic Nerve - OU - Both Eyes (Completed)    Myopia of both eyes with astigmatism and presbyopia - Primary    Moderate change from habitual Rx.  Pt educated on findings. Release Rx for full time wear. Discussed adaptation. Monitor annually. Pt voiced understanding.          Round hole of left retina without detachment    Small operculated hole at 7:00 OS. Pt asymptomatic.  Pt educated on findings. Discussed signs and symptoms of retinal hole, tear, detachment. Patient educated that retinal detachment can lead to permanent vision loss. Patient consents to return if they notice new floaters, flashes, curtain or veil covering vision. Monitor annually with dilated fundus examination. Pt voiced understanding.          Age-related nuclear cataract of both eyes    1+ NS OU.  Not visually significant.  Pt educated on findings and importance of UV protection when outdoors. Recommend anti-glare coating in lenses to reduce symptoms of night glare. Continue to monitor annually at this time. Pt voiced understanding.

## 2025-04-30 ENCOUNTER — APPOINTMENT (OUTPATIENT)
Dept: DERMATOLOGY | Facility: CLINIC | Age: 60
End: 2025-04-30
Payer: MEDICAID

## 2025-04-30 DIAGNOSIS — L84 CALLUS: ICD-10-CM

## 2025-04-30 DIAGNOSIS — L20.9 ATOPIC DERMATITIS, UNSPECIFIED TYPE: Primary | ICD-10-CM

## 2025-04-30 PROCEDURE — 99214 OFFICE O/P EST MOD 30 MIN: CPT | Performed by: STUDENT IN AN ORGANIZED HEALTH CARE EDUCATION/TRAINING PROGRAM

## 2025-04-30 RX ORDER — TRIAMCINOLONE ACETONIDE 1 MG/G
CREAM TOPICAL 2 TIMES DAILY
Qty: 453.6 G | Refills: 3 | Status: SHIPPED | OUTPATIENT
Start: 2025-04-30

## 2025-04-30 NOTE — Clinical Note
The chronic and intermittently flaring nature of this skin condition was discussed. Advised that this occurs due to a genetic defect in the skin barrier resulting in increased loss of water from skin resulting in dry, itchy, inflammed skin. Atopic dermatitis treatment goal is to restore the skin barrier.    We discussed a gentle skin care regimen including washing with a mild soap without fragrance such as dove for sensitive skin, cetaphil or cerave. Pat dry after washing and then apply a thick moisturizer such as vaseline, Aquaphor, Cerave cream or Cetaphil cream.  Reapplication multiple times per day may be necessary.     Patient advised to apply triamcinolone 0.1% cream to affected areas on body 2x daily x 2 weeks, then one week off, and repeat as needed. Side effects of topical steroids were reviewed including risk of skin atrophy    We reviewed that the post-inflammatory hyperpigmentation will resolve over time and may still be present even after the atopic dermatitis resolves with corticosteroid creams. Advised sun protection to prevent worsening of his hyperpigmentation.

## 2025-04-30 NOTE — Clinical Note
Erythematous scaly lichenified plaques on the bilateral upper extremities and trunk with lichenification. Erythematous scaly plaques on the bilateral lower extremities.   Hyperpigmented patches consistent with post-inflammatory hyperpigmentation

## 2025-04-30 NOTE — Clinical Note
Pt reported to have frostbite on his toes earlier this year  Appears to be healing well  Reassurance provided  Advised skin may harden in the area as it heals

## 2025-04-30 NOTE — PROGRESS NOTES
"Jairo Betancourt \"Tavon\" is a 60 y.o. male who presents for the following: Eczema (Patient has a Hx of atopic dermatitis. Currently applying Lidex ointment when flaring. He would like refills today.) and Suspicious Skin Lesion (Patient would like toes on bilateral feet examined as he experienced frost bite last winter.).          Review of Systems:  No other skin or systemic complaints other than what is documented elsewhere in the note.    The following portions of the chart were reviewed this encounter and updated as appropriate:         Skin Cancer History  Biopsy Log Book  No skin cancers from Specimen Tracking.    Additional History      Specialty Problems    None       Objective   Well appearing patient in no apparent distress; mood and affect are within normal limits.    A full examination was performed including scalp, head, eyes, ears, nose, lips, neck, chest, axillae, abdomen, back, buttocks, bilateral upper extremities, bilateral lower extremities, hands, feet, fingers, toes, fingernails, and toenails. All findings within normal limits unless otherwise noted below.    Assessment/Plan   Skin Exam  1. ATOPIC DERMATITIS, UNSPECIFIED TYPE  Generalized  Erythematous scaly lichenified plaques on the bilateral upper extremities and trunk with lichenification. Erythematous scaly plaques on the bilateral lower extremities.   Hyperpigmented patches consistent with post-inflammatory hyperpigmentation  The chronic and intermittently flaring nature of this skin condition was discussed. Advised that this occurs due to a genetic defect in the skin barrier resulting in increased loss of water from skin resulting in dry, itchy, inflammed skin. Atopic dermatitis treatment goal is to restore the skin barrier.    We discussed a gentle skin care regimen including washing with a mild soap without fragrance such as dove for sensitive skin, cetaphil or cerave. Pat dry after washing and then apply a thick " moisturizer such as vaseline, Aquaphor, Cerave cream or Cetaphil cream.  Reapplication multiple times per day may be necessary.     Patient advised to apply triamcinolone 0.1% cream to affected areas on body 2x daily x 2 weeks, then one week off, and repeat as needed. Side effects of topical steroids were reviewed including risk of skin atrophy    We reviewed that the post-inflammatory hyperpigmentation will resolve over time and may still be present even after the atopic dermatitis resolves with corticosteroid creams. Advised sun protection to prevent worsening of his hyperpigmentation.  triamcinolone (Kenalog) 0.1 % cream  Apply topically 2 times a day.    white petrolatum 41 % ointment  Apply 1 Application topically every 1 hour if needed (dryness).  Related Procedures  Follow Up In Dermatology - Established Patient  2. CALLUS  Left Hallux Medial Paronychium of Toe  Thickened, hyperkeratotic plaque on the big toe   Pt reported to have frostbite on his toes earlier this year  Appears to be healing well  Reassurance provided  Advised skin may harden in the area as it heals    RTC 6 months,   Patient seen and discussed with Dr. Davis,   Alicia Edge MD MPH  PGY-2, Department of Dermatology    I saw and evaluated the patient. I personally obtained the key and critical portions of the history and physical exam or was physically present for key and critical portions performed by the resident. I reviewed the resident's documentation and discussed the patient with the resident. I agree with the resident's medical decision making as documented in the note.    Jackie Davis MD

## 2025-07-22 ENCOUNTER — APPOINTMENT (OUTPATIENT)
Dept: OPHTHALMOLOGY | Facility: CLINIC | Age: 60
End: 2025-07-22
Payer: COMMERCIAL

## 2025-07-29 ENCOUNTER — APPOINTMENT (OUTPATIENT)
Dept: OPHTHALMOLOGY | Facility: CLINIC | Age: 60
End: 2025-07-29
Payer: MEDICAID

## 2025-07-29 DIAGNOSIS — H40.003 GLAUCOMA SUSPECT OF BOTH EYES: Primary | ICD-10-CM

## 2025-07-29 PROCEDURE — 92083 EXTENDED VISUAL FIELD XM: CPT | Performed by: OPTOMETRIST

## 2025-07-29 PROCEDURE — 76514 ECHO EXAM OF EYE THICKNESS: CPT | Performed by: OPTOMETRIST

## 2025-07-29 PROCEDURE — 99213 OFFICE O/P EST LOW 20 MIN: CPT | Performed by: OPTOMETRIST

## 2025-07-29 ASSESSMENT — ENCOUNTER SYMPTOMS
PSYCHIATRIC NEGATIVE: 0
CARDIOVASCULAR NEGATIVE: 0
EYES NEGATIVE: 1
ALLERGIC/IMMUNOLOGIC NEGATIVE: 0
HEMATOLOGIC/LYMPHATIC NEGATIVE: 0
NEUROLOGICAL NEGATIVE: 0
RESPIRATORY NEGATIVE: 0
CONSTITUTIONAL NEGATIVE: 0
GASTROINTESTINAL NEGATIVE: 0
MUSCULOSKELETAL NEGATIVE: 0
ENDOCRINE NEGATIVE: 0

## 2025-07-29 ASSESSMENT — TONOMETRY
OD_IOP_MMHG: 23
IOP_METHOD: GOLDMANN APPLANATION
IOP_METHOD: GOLDMANN APPLANATION
OS_IOP_MMHG: 23
OD_IOP_MMHG: 23
OS_IOP_MMHG: 24

## 2025-07-29 ASSESSMENT — VISUAL ACUITY
OS_PH_CC: 20/25
OS_PH_CC+: -1
OD_CC: 20/25
METHOD: SNELLEN - LINEAR
CORRECTION_TYPE: GLASSES
OS_CC: 20/30

## 2025-07-29 ASSESSMENT — CUP TO DISC RATIO
OD_RATIO: 0.7
OS_RATIO: 0.75

## 2025-07-29 ASSESSMENT — PACHYMETRY
OS_CT(UM): 598
OD_CT(UM): 624

## 2025-07-29 ASSESSMENT — SLIT LAMP EXAM - LIDS
COMMENTS: NORMAL
COMMENTS: NORMAL

## 2025-07-29 ASSESSMENT — REFRACTION_WEARINGRX
OS_SPHERE: -4.50
OD_SPHERE: -4.25

## 2025-07-29 ASSESSMENT — EXTERNAL EXAM - RIGHT EYE: OD_EXAM: NORMAL

## 2025-07-29 ASSESSMENT — EXTERNAL EXAM - LEFT EYE: OS_EXAM: NORMAL

## 2025-07-29 NOTE — ASSESSMENT & PLAN NOTE
High risk open angle suspect due to large cupping and higher IOP.  -FMHx: None  -CD Ratio: OD: 0.7/0.7, OS: 0.75/0.75  -Intraocular pressure (IOP) today: 23/23mmHg (pt apprehensive)  -RNFL OCT (04/22/25): OD w/ inferior temporal RNFL thinning, OS: WNL  -HVF 24-2 SF (07/29/25): OD: Scattered defects, OS: Scattered defects - no glaucomatous defects OU.  -Pachymetry: 624/598um  Pt educated on findings. Discussed permanent vision loss associated with glaucoma. Ok to monitor annually at this time. Will obtain RNFL OCT at next CEE on 04/2026. Pt voiced understanding.    Pt would like to schedule for 3-4 months f/u for CL fit. Biweekly lenses.

## 2025-07-29 NOTE — PROGRESS NOTES
Assessment/Plan   Problem List Items Addressed This Visit          Eye/Vision problems    Glaucoma suspect of both eyes - Primary    High risk open angle suspect due to large cupping and higher IOP.  -FMHx: None  -CD Ratio: OD: 0.7/0.7, OS: 0.75/0.75  -Intraocular pressure (IOP) today: 23/23mmHg (pt apprehensive)  -RNFL OCT (04/22/25): OD w/ inferior temporal RNFL thinning, OS: WNL  -HVF 24-2 SF (07/29/25): OD: Scattered defects, OS: Scattered defects - no glaucomatous defects OU.  -Pachymetry: 624/598um  Pt educated on findings. Discussed permanent vision loss associated with glaucoma. Ok to monitor annually at this time. Will obtain RNFL OCT at next CEE on 04/2026. Pt voiced understanding.    Pt would like to schedule for 3-4 months f/u for CL fit. Biweekly lenses.         Relevant Orders    [facility] Visual [facility] - OU - Both Eyes (Completed)

## 2025-08-07 ENCOUNTER — APPOINTMENT (OUTPATIENT)
Dept: DERMATOLOGY | Facility: CLINIC | Age: 60
End: 2025-08-07
Payer: MEDICAID

## 2025-08-07 DIAGNOSIS — L20.9 ATOPIC DERMATITIS, UNSPECIFIED TYPE: ICD-10-CM

## 2025-08-07 DIAGNOSIS — L81.0 POST-INFLAMMATORY HYPERPIGMENTATION: Primary | ICD-10-CM

## 2025-08-07 PROCEDURE — 99213 OFFICE O/P EST LOW 20 MIN: CPT | Performed by: STUDENT IN AN ORGANIZED HEALTH CARE EDUCATION/TRAINING PROGRAM

## 2025-08-07 NOTE — PROGRESS NOTES
"Jairo Betancourt \"Tavon\" is a 60 y.o. male who presents for the following: Dermatitis (Follow up for atopic dermatitis. Last visit prescribed triamcinolone and white petrolatum. Patient's symptoms have improved; however, he is concerned with pigmentation variances of skin.).          Review of Systems:  No other skin or systemic complaints other than what is documented elsewhere in the note.    The following portions of the chart were reviewed this encounter and updated as appropriate:          Skin Cancer History  Biopsy Log Book  Biopsied Type Location Status   12/14/23 Other Benign Neoplasm Left Forearm - Anterior Schedule Follow-up  8/7/25       Additional History      Specialty Problems    None       Objective   Well appearing patient in no apparent distress; mood and affect are within normal limits.    A focused skin examination was performed. All findings within normal limits unless otherwise noted below.    Assessment/Plan   Skin Exam  1. ATOPIC DERMATITIS, UNSPECIFIED TYPE  Generalized  Hyperpigmented patches on trunk and extremities    Clear of active rash  The chronic and intermittently flaring nature of this skin condition was discussed. Advised that this occurs due to a genetic defect in the skin barrier resulting in increased loss of water from skin resulting in dry, itchy, inflammed skin. Atopic dermatitis treatment goal is to restore the skin barrier.    Continue gentle skin care regimen. Use vaseline as moisturizer.     Patient advised to apply triamcinolone 0.1% cream to affected areas on body 2x daily x 2 weeks, then one week off, and repeat as needed. Side effects of topical steroids were reviewed including risk of skin atrophy. Discussed he only needs to use this when he starts flaring. Otherwise continue with vaseline.     We reviewed that the post-inflammatory hyperpigmentation will resolve over time and may still be present even after the atopic dermatitis resolves with " corticosteroid creams. Advised sun protection to prevent worsening of his hyperpigmentation.  This Visit  - Follow Up In Dermatology - Established Patient  Existing Treatments  - triamcinolone (Kenalog) 0.1 % cream - Apply topically 2 times a day.  - white petrolatum 41 % ointment - Apply 1 Application topically every 1 hour if needed (dryness).  2. POST-INFLAMMATORY HYPERPIGMENTATION  Generalized

## 2025-08-07 NOTE — Clinical Note
The chronic and intermittently flaring nature of this skin condition was discussed. Advised that this occurs due to a genetic defect in the skin barrier resulting in increased loss of water from skin resulting in dry, itchy, inflammed skin. Atopic dermatitis treatment goal is to restore the skin barrier.    Continue gentle skin care regimen. Use vaseline as moisturizer.     Patient advised to apply triamcinolone 0.1% cream to affected areas on body 2x daily x 2 weeks, then one week off, and repeat as needed. Side effects of topical steroids were reviewed including risk of skin atrophy. Discussed he only needs to use this when he starts flaring. Otherwise continue with vaseline.     We reviewed that the post-inflammatory hyperpigmentation will resolve over time and may still be present even after the atopic dermatitis resolves with corticosteroid creams. Advised sun protection to prevent worsening of his hyperpigmentation.

## 2025-10-30 ENCOUNTER — APPOINTMENT (OUTPATIENT)
Dept: OPHTHALMOLOGY | Facility: CLINIC | Age: 60
End: 2025-10-30
Payer: MEDICAID

## 2026-07-30 ENCOUNTER — APPOINTMENT (OUTPATIENT)
Dept: OPHTHALMOLOGY | Facility: CLINIC | Age: 61
End: 2026-07-30
Payer: MEDICAID

## 2026-08-12 ENCOUNTER — APPOINTMENT (OUTPATIENT)
Dept: DERMATOLOGY | Facility: CLINIC | Age: 61
End: 2026-08-12
Payer: MEDICAID